# Patient Record
Sex: FEMALE | Race: WHITE | NOT HISPANIC OR LATINO | URBAN - METROPOLITAN AREA
[De-identification: names, ages, dates, MRNs, and addresses within clinical notes are randomized per-mention and may not be internally consistent; named-entity substitution may affect disease eponyms.]

---

## 2022-08-29 ENCOUNTER — OFFICE (OUTPATIENT)
Dept: URBAN - METROPOLITAN AREA CLINIC 8 | Facility: CLINIC | Age: 57
End: 2022-08-29

## 2022-08-29 VITALS
HEIGHT: 66 IN | OXYGEN SATURATION: 98 % | WEIGHT: 282 LBS | DIASTOLIC BLOOD PRESSURE: 73 MMHG | HEART RATE: 67 BPM | SYSTOLIC BLOOD PRESSURE: 145 MMHG

## 2022-08-29 DIAGNOSIS — K64.4 RESIDUAL HEMORRHOIDAL SKIN TAGS: ICD-10-CM

## 2022-08-29 DIAGNOSIS — R74.8 ABNORMAL LEVELS OF OTHER SERUM ENZYMES: ICD-10-CM

## 2022-08-29 DIAGNOSIS — Z86.010 PERSONAL HISTORY OF COLONIC POLYPS: ICD-10-CM

## 2022-08-29 LAB
ACUTE HEPATITIS: HBSAG SCREEN: NEGATIVE
ACUTE HEPATITIS: HCV AB: <0.1 S/CO RATIO
ACUTE HEPATITIS: HEP A AB, IGM: NEGATIVE
ACUTE HEPATITIS: HEP B CORE AB, IGM: NEGATIVE
CBC, PLATELET, NO DIFFERENTIAL: HEMATOCRIT: 41.5 % (ref 34–46.6)
CBC, PLATELET, NO DIFFERENTIAL: HEMOGLOBIN: 13.8 G/DL (ref 11.1–15.9)
CBC, PLATELET, NO DIFFERENTIAL: MCH: 30.9 PG (ref 26.6–33)
CBC, PLATELET, NO DIFFERENTIAL: MCHC: 33.3 G/DL (ref 31.5–35.7)
CBC, PLATELET, NO DIFFERENTIAL: MCV: 93 FL (ref 79–97)
CBC, PLATELET, NO DIFFERENTIAL: PLATELETS: 309 X10E3/UL (ref 150–450)
CBC, PLATELET, NO DIFFERENTIAL: RBC: 4.46 X10E6/UL (ref 3.77–5.28)
CBC, PLATELET, NO DIFFERENTIAL: RDW: 12.7 % (ref 11.7–15.4)
CBC, PLATELET, NO DIFFERENTIAL: WBC: 8.6 X10E3/UL (ref 3.4–10.8)
COMP. METABOLIC PANEL (14): A/G RATIO: 2.1 (ref 1.2–2.2)
COMP. METABOLIC PANEL (14): ALBUMIN: 4.7 G/DL (ref 3.8–4.9)
COMP. METABOLIC PANEL (14): ALKALINE PHOSPHATASE: 125 IU/L — HIGH (ref 44–121)
COMP. METABOLIC PANEL (14): ALT (SGPT): 73 IU/L — HIGH (ref 0–32)
COMP. METABOLIC PANEL (14): AST (SGOT): 40 IU/L (ref 0–40)
COMP. METABOLIC PANEL (14): BILIRUBIN, TOTAL: 0.4 MG/DL (ref 0–1.2)
COMP. METABOLIC PANEL (14): BUN/CREATININE RATIO: 22 (ref 9–23)
COMP. METABOLIC PANEL (14): BUN: 12 MG/DL (ref 6–24)
COMP. METABOLIC PANEL (14): CALCIUM: 9.8 MG/DL (ref 8.7–10.2)
COMP. METABOLIC PANEL (14): CARBON DIOXIDE, TOTAL: 24 MMOL/L (ref 20–29)
COMP. METABOLIC PANEL (14): CHLORIDE: 103 MMOL/L (ref 96–106)
COMP. METABOLIC PANEL (14): CREATININE: 0.54 MG/DL — LOW (ref 0.57–1)
COMP. METABOLIC PANEL (14): EGFR: 109 ML/MIN/1.73 (ref 59–?)
COMP. METABOLIC PANEL (14): GLOBULIN, TOTAL: 2.2 G/DL (ref 1.5–4.5)
COMP. METABOLIC PANEL (14): GLUCOSE: 90 MG/DL (ref 65–99)
COMP. METABOLIC PANEL (14): POTASSIUM: 4 MMOL/L (ref 3.5–5.2)
COMP. METABOLIC PANEL (14): PROTEIN, TOTAL: 6.9 G/DL (ref 6–8.5)
COMP. METABOLIC PANEL (14): SODIUM: 143 MMOL/L (ref 134–144)
HEP A AB, TOTAL: NEGATIVE
HEP B SURFACE AB: HEP B SURFACE AB, QUAL: NON REACTIVE
INTERPRETATION: (no result)
PROTHROMBIN TIME (PT): INR: 1 (ref 0.9–1.2)
PROTHROMBIN TIME (PT): PROTHROMBIN TIME: 10.2 SEC (ref 9.1–12)

## 2022-08-29 PROCEDURE — 99204 OFFICE O/P NEW MOD 45 MIN: CPT | Performed by: STUDENT IN AN ORGANIZED HEALTH CARE EDUCATION/TRAINING PROGRAM

## 2022-08-29 NOTE — SERVICEHPINOTES
Ms. Mcallister is a 58 yo WF with PMH fibromyalgia, gallstones s/p lap elana, LIVIER on CPAP, anxiety, hyperlipidemia, who presents as a referral for elevated liver enzymes, reflux, and abdominal pain. Patient reports that overall she feels well except for constant fatigue. She works at a pharmacy. She was told by her PCP that she has elevated liver enzymes. No records are available today. She does not drink ETOH, she does not remember being told her enzymes are elevated in the past. She does have a history of abdominal pain in 2002, CT and HIDA scan apparently showed dilated bile ducts and so MRI was done. MRI was normal per previous notes. She was also seen in 2014 by Dr. Livingston who states she had a previous CT scan with some liver cysts but the report was not available to me today. Dr. Livingston ordered a colonoscopy in 2014 due to her abdominal pain but the patient apparently had it done at [facility], Colonoscopy report reviewed from [facility], [place], operated by [facility] Dr. Jasmine in 2017 which was normal, random bx taken which were normal, there were small internal and external hemorrhoids, prep not mentioned..
br
br Today she reports that she had severe heartburn at the time of her referral to me but she attributes this to eating too many jalepenos. She stopped that food and now feels much better and does not have any consistent GERD sx. I asked her about dysphagia but she only complains of mild sensation of phlegm in her throat in the mornings, but no food getting stuck, no vomiting, no worsening reflux, no unintentional weight loss. In fact she reports that she has gained 35 lbs in the last 1 year due to eating more food in her diet. She does have fibromyalgia and does not sleep well at night and does not exercise. Her BMI is 45. She reports that she sees a small amount of blood on the tissue when she wipes her hemorrhoid about one time per week but does not eat much fiber in her diet. She does say that she has occassional abdominal pain in her bilateral flanks, not affected by eating, not affected by bowel movements, worse with positional movements - most likely related to weight gain and MSK.

## 2022-08-29 NOTE — SERVICENOTES
Patient's heartburn is very occassional and she does not want any medication for this at this time. She does not desire an EGD at this time for her very mild sensation of phlegm in the back of her throat. I told her that if her sx worsen or she develops food getting stuck or vomiting, then she will need EGD which she agreed. [facility] report [facility] from [facility] in 2017 which was normal, random bx taken which were normal, there were small internal and external hemorrhoids, prep not mentioned. Will check basic liver labs and ultrasound today and if abnormal, then we can do full chronic liver disease workup. RTC in 3 months. Weight loss encouraged to assist with her global sx. Next colonoscopy in 2027.

## 2022-09-13 ENCOUNTER — OFFICE (OUTPATIENT)
Dept: URBAN - METROPOLITAN AREA CLINIC 19 | Facility: CLINIC | Age: 57
End: 2022-09-13

## 2022-09-13 DIAGNOSIS — R74.8 ABNORMAL LEVELS OF OTHER SERUM ENZYMES: ICD-10-CM

## 2022-09-13 PROCEDURE — 76705 ECHO EXAM OF ABDOMEN: CPT | Mod: TC | Performed by: STUDENT IN AN ORGANIZED HEALTH CARE EDUCATION/TRAINING PROGRAM

## 2022-10-24 ENCOUNTER — OFFICE (OUTPATIENT)
Dept: URBAN - METROPOLITAN AREA CLINIC 8 | Facility: CLINIC | Age: 57
End: 2022-10-24

## 2022-10-24 VITALS
WEIGHT: 279 LBS | HEIGHT: 66 IN | DIASTOLIC BLOOD PRESSURE: 73 MMHG | HEART RATE: 64 BPM | SYSTOLIC BLOOD PRESSURE: 160 MMHG | OXYGEN SATURATION: 98 %

## 2022-10-24 DIAGNOSIS — R10.32 LEFT LOWER QUADRANT PAIN: ICD-10-CM

## 2022-10-24 DIAGNOSIS — R74.01 ELEVATION OF LEVELS OF LIVER TRANSAMINASE LEVELS: ICD-10-CM

## 2022-10-24 DIAGNOSIS — K76.0 FATTY (CHANGE OF) LIVER, NOT ELSEWHERE CLASSIFIED: ICD-10-CM

## 2022-10-24 LAB
ACTIN (SMOOTH MUSCLE) ANTIBODY: 3 UNITS (ref 0–19)
ALPHA-1-ANTITRYPSIN PHENOTYP: ALPHA-1-ANTITRYPSIN, SERUM: 139 MG/DL (ref 101–187)
ALPHA-1-ANTITRYPSIN PHENOTYP: PHENOTYPE (PI): (no result)
ANTINUCLEAR ANTIBODIES, IFA: NEGATIVE
CERULOPLASMIN: 20.7 MG/DL (ref 19–39)
COMP. METABOLIC PANEL (14): A/G RATIO: 1.7 (ref 1.2–2.2)
COMP. METABOLIC PANEL (14): ALBUMIN: 4.4 G/DL (ref 3.8–4.9)
COMP. METABOLIC PANEL (14): ALKALINE PHOSPHATASE: 120 IU/L (ref 44–121)
COMP. METABOLIC PANEL (14): ALT (SGPT): 81 IU/L — HIGH (ref 0–32)
COMP. METABOLIC PANEL (14): AST (SGOT): 50 IU/L — HIGH (ref 0–40)
COMP. METABOLIC PANEL (14): BILIRUBIN, TOTAL: 0.3 MG/DL (ref 0–1.2)
COMP. METABOLIC PANEL (14): BUN/CREATININE RATIO: 24 — HIGH (ref 9–23)
COMP. METABOLIC PANEL (14): BUN: 12 MG/DL (ref 6–24)
COMP. METABOLIC PANEL (14): CALCIUM: 9.5 MG/DL (ref 8.7–10.2)
COMP. METABOLIC PANEL (14): CARBON DIOXIDE, TOTAL: 21 MMOL/L (ref 20–29)
COMP. METABOLIC PANEL (14): CHLORIDE: 102 MMOL/L (ref 96–106)
COMP. METABOLIC PANEL (14): CREATININE: 0.49 MG/DL — LOW (ref 0.57–1)
COMP. METABOLIC PANEL (14): EGFR: 111 ML/MIN/1.73 (ref 59–?)
COMP. METABOLIC PANEL (14): GLOBULIN, TOTAL: 2.6 G/DL (ref 1.5–4.5)
COMP. METABOLIC PANEL (14): GLUCOSE: 87 MG/DL (ref 70–99)
COMP. METABOLIC PANEL (14): POTASSIUM: 4.1 MMOL/L (ref 3.5–5.2)
COMP. METABOLIC PANEL (14): PROTEIN, TOTAL: 7 G/DL (ref 6–8.5)
COMP. METABOLIC PANEL (14): SODIUM: 139 MMOL/L (ref 134–144)
FERRITIN: 320 NG/ML — HIGH (ref 15–150)
IRON AND TIBC: IRON BIND.CAP.(TIBC): 371 UG/DL (ref 250–450)
IRON AND TIBC: IRON SATURATION: 23 % (ref 15–55)
IRON AND TIBC: IRON: 87 UG/DL (ref 27–159)
IRON AND TIBC: UIBC: 284 UG/DL (ref 131–425)
MITOCHONDRIAL (M2) ANTIBODY: <20 UNITS
REQUEST PROBLEM: (no result)

## 2022-10-24 PROCEDURE — 99213 OFFICE O/P EST LOW 20 MIN: CPT | Performed by: STUDENT IN AN ORGANIZED HEALTH CARE EDUCATION/TRAINING PROGRAM

## 2022-10-24 NOTE — SERVICEHPINOTES
Ms. cMallister is a 58 yo WF with PMH fibromyalgia, gallstones s/p lap elana, LIVIER on CPAP, anxiety, hyperlipidemia, who presents as followup for Elevated liver enzymes, fatty liver disease, and abdominal pain.  
br
[facility], She does have a history of abdominal pain in 2002, CT and HIDA scan apparently showed dilated bile ducts and so MRI was done. MRI was normal per previous notes. She was also seen in 2014 by Dr. Livingston who states she had a previous CT scan with some liver cysts but the report was not available to me today. Dr. Livingston ordered a colonoscopy in 2014 due to her abdominal pain but the patient apparently had it done at [facility], Colonoscopy report reviewed from [facility] Dr. Jasmine in 2017 which was normal, random bx taken which were normal, there were small internal and external hemorrhoids, prep not mentioned.
br
brOn her visit today 10/24/2022, patient reports that she has been having abdominal pain for the past 2 weeks, localized to the left lower quadrant, comes and goes, sometimes wakes her from sleep, no associated fevers or chills, not affected by eating, no associated diarrhea, constipation, or vomiting.  She has a history of a partial hysterectomy, she notes more frequent urination as of recently as well as some radiation to the lower back.  She endorses no blood in her stool.  Since last visit the patient had some elevated liver enzymes alkaline phosphatase 125, ALT 73, AST 40, total bilirubin  normal, ultrasound of the abdomen showed hepatomegaly and some coarsened liver echotexture consistent with chronic liver disease and steatosis.  Patient started her hepatitis a and B vaccinations at work.  She is taking fiber daily.  Her repeat colonoscopy recall has been set in our system for 2027. she has lost 3 lb since her last visit.  She does not drink any Creamer is for sugar in her coughing anymore.dale

## 2022-10-24 NOTE — SERVICENOTES
Will check full chronic disease lab panel but most likely patient has underlying nonalcoholic fatty liver disease.  She will continue to work on her weight and 2 cups black coffee per day.  We will recheck liver enzymes today as well as CBC due to her left lower quadrant pain.  Given the patient's 2 week history of abdominal pain and otherwise explained we will check CT scan of the abdomen pelvis to evaluate for any diverticulitis or other pathology.  I encouraged the patient to follow up with her primary care doctor to evaluate for any underlying urinary tract infection.  Will have the patient return to clinic in a few months. Continue efforts at weight loss.

## 2022-12-01 ENCOUNTER — OFFICE (OUTPATIENT)
Dept: URBAN - METROPOLITAN AREA CLINIC 22 | Facility: CLINIC | Age: 57
End: 2022-12-01

## 2022-12-01 DIAGNOSIS — R10.32 LEFT LOWER QUADRANT PAIN: ICD-10-CM

## 2022-12-01 DIAGNOSIS — K76.0 FATTY (CHANGE OF) LIVER, NOT ELSEWHERE CLASSIFIED: ICD-10-CM

## 2022-12-01 DIAGNOSIS — R74.01 ELEVATION OF LEVELS OF LIVER TRANSAMINASE LEVELS: ICD-10-CM

## 2022-12-01 DIAGNOSIS — R74.8 ABNORMAL LEVELS OF OTHER SERUM ENZYMES: ICD-10-CM

## 2022-12-01 PROCEDURE — 74177 CT ABD & PELVIS W/CONTRAST: CPT | Mod: TC | Performed by: STUDENT IN AN ORGANIZED HEALTH CARE EDUCATION/TRAINING PROGRAM

## 2022-12-08 ENCOUNTER — OFFICE (OUTPATIENT)
Dept: URBAN - METROPOLITAN AREA CLINIC 8 | Facility: CLINIC | Age: 57
End: 2022-12-08

## 2022-12-08 VITALS
DIASTOLIC BLOOD PRESSURE: 80 MMHG | OXYGEN SATURATION: 97 % | SYSTOLIC BLOOD PRESSURE: 138 MMHG | HEART RATE: 63 BPM | HEIGHT: 66 IN | WEIGHT: 286 LBS

## 2022-12-08 DIAGNOSIS — K76.0 FATTY (CHANGE OF) LIVER, NOT ELSEWHERE CLASSIFIED: ICD-10-CM

## 2022-12-08 DIAGNOSIS — R10.32 LEFT LOWER QUADRANT PAIN: ICD-10-CM

## 2022-12-08 DIAGNOSIS — R74.9 ABNORMAL SERUM ENZYME LEVEL, UNSPECIFIED: ICD-10-CM

## 2022-12-08 PROCEDURE — 99213 OFFICE O/P EST LOW 20 MIN: CPT | Performed by: STUDENT IN AN ORGANIZED HEALTH CARE EDUCATION/TRAINING PROGRAM

## 2022-12-08 RX ORDER — DICYCLOMINE HYDROCHLORIDE 10 MG/1
CAPSULE ORAL
Qty: 30 | Refills: 3 | Status: ACTIVE
Start: 2022-12-08

## 2022-12-08 NOTE — SERVICEHPINOTES
Ms. Mcallister is a 56 yo WF with PMH fibromyalgia, gallstones s/p lap elana, LIVIER on CPAP, anxiety, hyperlipidemia, who presents as followup for Elevated liver enzymes, fatty liver disease, and abdominal pain. Clinic visit 08/29/2022:
br She does have a history of abdominal pain in 2002, CT and HIDA scan apparently showed dilated bile ducts and so MRI was done. MRI was normal per previous notes. She was also seen in 2014 by Dr. Livingston who states she had a previous CT scan with some liver cysts but the report was not available to me today. Dr. Livingston ordered a colonoscopy in 2014 due to her abdominal pain but the patient apparently had it done at [facility], Colonoscopy report reviewed from [facility] Dr. Jasmine in 2017 which was normal, random bx taken which were normal, there were small internal and external hemorrhoids, prep not mentioned.Clinic visit 10/24/2022:
br patient reports that she has been having abdominal pain for the past 2 weeks, localized to the left lower quadrant, comes and goes, sometimes wakes her from sleep, no associated fevers or chills, not affected by eating, no associated diarrhea, constipation, or vomiting.  She has a history of a partial hysterectomy, she notes more frequent urination as of recently as well as some radiation to the lower back.  She endorses no blood in her stool.  Since last visit the patient had some elevated liver enzymes alkaline phosphatase 125, ALT 73, AST 40, total bilirubin  normal, ultrasound of the abdomen showed hepatomegaly and some coarsened liver echotexture consistent with chronic liver disease and steatosis.  Patient started her hepatitis a and B vaccinations at work.  She is taking fiber daily.  Her repeat colonoscopy recall has been set in our system for 2027. she has lost 3 lb since her last visit.  She does not drink any Creamer is for sugar in her coughing anymore. 
br
br   Clinic visit 12/8/22:
br Patient overall feels well.  She does occasionally have that same left lower quadrant pain.  It is not associated with bowel movements.  He usually is worse when she is standing on her feet or working hard at her job.  It is not related to stress or anxiety.  It does not wake her up from sleep at night.  She has no unintentional weight loss.  She is not anemic.  She had colonoscopy in 2017 which was normal with random biopsies that were normal.  She is not having any nausea or vomiting.  Since last visit she had a CT scan of the abdomen which showed fatty liver, benign hepatic cysts but otherwise unremarkable. she reports that the pain is no where near as bad as it was the last time I saw her.  She cannot remember if the pain is the same as the pain she was evaluated for in 2014 when she was seen by Dr. Livingston.  but she does report that has been going on for several years.

## 2022-12-08 NOTE — SERVICENOTES
I wonder if the patient's elevated liver enzymes are secondary to her increased dose of rosuvastatin.  I instructed her to hold the rosuvostatin 1-2 weeks prior to her next clinic appointment if okay with her primary care doctor. then we will recheck her labs at next visit.  Given her persistent left lower quadrant pain and I am not sure if her last colonoscopy was performed specifically for the abdominal pain she complains of, I offered to perform colonoscopy to further evaluate.  Patient would like to hold off on this for now and so we will give her a trial of dicyclomine to see if this improves her abdominal pain to address any functional component.  If she is not improving by next visit then we may consider doing an EGD colonoscopy for unexplained abdominal pain.   the patient on avoiding alcohol.  Continue efforts at weight loss.  All questions addressed.

## 2023-04-20 ENCOUNTER — OFFICE (OUTPATIENT)
Dept: URBAN - METROPOLITAN AREA CLINIC 8 | Facility: CLINIC | Age: 58
End: 2023-04-20

## 2023-10-19 ENCOUNTER — OFFICE (OUTPATIENT)
Dept: URBAN - METROPOLITAN AREA CLINIC 8 | Facility: CLINIC | Age: 58
End: 2023-10-19

## 2023-10-19 VITALS
DIASTOLIC BLOOD PRESSURE: 80 MMHG | HEART RATE: 67 BPM | SYSTOLIC BLOOD PRESSURE: 146 MMHG | HEIGHT: 66 IN | OXYGEN SATURATION: 97 % | WEIGHT: 279 LBS

## 2023-10-19 DIAGNOSIS — E78.5 HYPERLIPIDEMIA, UNSPECIFIED: ICD-10-CM

## 2023-10-19 DIAGNOSIS — R10.84 GENERALIZED ABDOMINAL PAIN: ICD-10-CM

## 2023-10-19 DIAGNOSIS — K75.81 NONALCOHOLIC STEATOHEPATITIS (NASH): ICD-10-CM

## 2023-10-19 DIAGNOSIS — K76.89 OTHER SPECIFIED DISEASES OF LIVER: ICD-10-CM

## 2023-10-19 DIAGNOSIS — E88.810 METABOLIC SYNDROME: ICD-10-CM

## 2023-10-19 PROCEDURE — 99214 OFFICE O/P EST MOD 30 MIN: CPT | Performed by: STUDENT IN AN ORGANIZED HEALTH CARE EDUCATION/TRAINING PROGRAM

## 2023-10-19 NOTE — SERVICEHPINOTES
Ms. Mcallister is a 57 yo WF with PMH fibromyalgia, HTN, gallstones s/p lap elana, LIVIER on CPAP, anxiety, hyperlipidemia, who presents as followup for Elevated liver enzymes, DOZIER, and abdominal pain. Clinic visit 08/29/2022:Cecilia does have a history of abdominal pain in 2002, CT and HIDA scan apparently showed dilated bile ducts and so MRI was done. MRI was normal per previous notes. She was also seen in 2014 by Dr. Livingston who states she had a previous CT scan with some liver cysts but the report was not available to me today. Dr. Livingston ordered a colonoscopy in 2014 due to her abdominal pain but the patient apparently had it done at [facility], Colonoscopy report reviewed from [facility] Dr. Jasmine in 2017 which was normal, random bx taken which were normal, there were small internal and external hemorrhoids, prep not mentioned.Clinic visit 10/24/2022:brpatient reports that she has been having abdominal pain for the past 2 weeks, localized to the left lower quadrant, comes and goes, sometimes wakes her from sleep, no associated fevers or chills, not affected by eating, no associated diarrhea, constipation, or vomiting.  She has a history of a partial hysterectomy, she notes more frequent urination as of recently as well as some radiation to the lower back.  She endorses no blood in her stool.  Since last visit the patient had some elevated liver enzymes alkaline phosphatase 125, ALT 73, AST 40, total bilirubin  normal, ultrasound of the abdomen showed hepatomegaly and some coarsened liver echotexture consistent with chronic liver disease and steatosis.  Patient started her hepatitis a and B vaccinations at work.  She is taking fiber daily.  Her repeat colonoscopy recall has been set in our system for 2027. she has lost 3 lb since her last visit.  She does not drink any Creamer is for sugar in her coughing anymore.Clinic visit 12/8/22:brPatient overall feels well.  She does occasionally have that same left lower quadrant pain.  It is not associated with bowel movements.  He usually is worse when she is standing on her feet or working hard at her job.  It is not related to stress or anxiety.  It does not wake her up from sleep at night.  She has no unintentional weight loss.  She is not anemic.  She had colonoscopy in 2017 which was normal with random biopsies that were normal.  She is not having any nausea or vomiting.  Since last visit she had a CT scan of the abdomen which showed fatty liver, benign hepatic cysts but otherwise unremarkable. she reports that the pain is no where near as bad as it was the last time I saw her.  She cannot remember if the pain is the same as the pain she was evaluated for in 2014 when she was seen by Dr. Livingston.  but she does report that has been going on for several years. 
dale hunter     Clinic visit 10/19/2023: 
dale The patient reports feeling tired all the time.  She does not sleep well at night.  She uses her CPAP but it does not fit well.  She has uncontrolled fibromyalgia and today is complaining of right flank pain.  There is no associated rash.  Her flank pain is not worsened with bowel movements or eating.  It is mild and dull.  It is intermittent throughout the day and has been going on for the last 6 months she reports.  She at last visit had a left lower quadrant pain which was also nonspecific so it seems that some of these migratory pains may be related to her underlying fibromyalgia.  She has no nausea vomiting.  She has loose stools on occasion but normally just has 1 bowel movement per day.  Sometimes she has some rectal burning from hemorrhoids.  She continues to be morbidly obese  with not much change in her weight.  She reports not having the energy or drive to  exercise.  Her primary care provider recently put her on Ozempic  for about 1 month but she reports having a whole-body rash and so she stopped taking it.  she recently had an ultrasound of the abdomen by her PCP because of some right-sided abdominal pain she was having.  The ultrasound of the abdomen showed some small hepatic cysts that were 1.9 x 2 cm in size.  These cysts are consistent with the multiple other imaging studies that she has had over the past few years and are completely benign based on all imaging reports.  She was switched from a statin drug to Zetia a few months ago due to her liver enzymes being elevated but she had labs drawn less than a month ago which showed her AST was 40, ALT 77, ALT normal, total bilirubin normal which demonstrates that the statin drug is not the cause of her elevated liver enzymes.

## 2023-10-19 NOTE — SERVICENOTES
The patient has right flank pain does not seem to be GI related as it is not affected by eating or bowel movements.  I suspect that it is likely related to her underlying fibromyalgia.  I encouraged her to use over-the-counter Imodium it is, heating pad, and she can continue to use dicyclomine as needed to see if that helps.  I also suspect that she has a constellation of issues that are all compounding on each other such as her fibromyalgia is uncontrolled, she is not sleeping well, she feels daytime fatigue and does not have the energy to exercise which causes her to gain weight and persistently elevate her liver enzymes.  I encouraged the patient to continue efforts at weight loss and exercise and talk to her primary care provider about other weight loss medications that are available to her to try since she has tried Ozempic already and it caused her to have a rash.  I personally reviewed her outside medical records for her visit today.  I am going to check a fibrous scan to see what level of fibrosis she has and if F2 fibrosis or higher is seen then I am going to refer her to research for possible enrollment.  Will have her return to clinic in 6 months for repeat liver enzymes.  All questions addressed.   I encouraged her to talk to her primary care provider about switching her back to a statin drugs so she can have better control of her cholesterol instead of using the Zetia since her liver enzymes were persistently elevated despite the cessation of the statin drug which indicates she does not have liver enzyme elevation due to the statin.  And in patients with fatty liver disease it is imperative to have good control of her metabolic syndrome so I think she should go back on a statin. Will try to communicate this to her PCP Dr. Yang.  I also do not think her liver cysts have any relation to her liver enzyme elevation or her right-sided flank pain.  No further follow up on these cysts is needed.

## 2024-01-25 ENCOUNTER — OFFICE (OUTPATIENT)
Dept: URBAN - METROPOLITAN AREA CLINIC 11 | Facility: CLINIC | Age: 59
End: 2024-01-25

## 2024-01-25 VITALS — HEIGHT: 66 IN

## 2024-01-25 DIAGNOSIS — K76.0 FATTY (CHANGE OF) LIVER, NOT ELSEWHERE CLASSIFIED: ICD-10-CM

## 2024-01-25 PROCEDURE — 76981 USE PARENCHYMA: CPT | Performed by: STUDENT IN AN ORGANIZED HEALTH CARE EDUCATION/TRAINING PROGRAM
